# Patient Record
Sex: MALE | Race: OTHER | URBAN - METROPOLITAN AREA
[De-identification: names, ages, dates, MRNs, and addresses within clinical notes are randomized per-mention and may not be internally consistent; named-entity substitution may affect disease eponyms.]

---

## 2022-01-18 NOTE — PATIENT DISCUSSION
FHX, THICK CCT.  HVF AND ERG REVIEWED W/ PT.  OCT BASELINE TODAY.  CONTINUE TO FOLLOW W/ DR PRICE W/ OCT, HVF AND OPTIC NERVE EVAL.  IF CHANGES SEND BACK TO DR Alexandria Crews.

## 2022-07-12 PROBLEM — Z98.890: Noted: 2022-07-12

## 2023-01-23 ENCOUNTER — NEW PATIENT (OUTPATIENT)
Dept: URBAN - METROPOLITAN AREA CLINIC 1 | Facility: CLINIC | Age: 63
End: 2023-01-23

## 2023-01-23 VITALS — HEIGHT: 72 IN | WEIGHT: 200 LBS | BODY MASS INDEX: 27.09 KG/M2

## 2023-01-23 DIAGNOSIS — H25.813: ICD-10-CM

## 2023-01-23 DIAGNOSIS — E11.3491: ICD-10-CM

## 2023-01-23 DIAGNOSIS — E11.3592: ICD-10-CM

## 2023-01-23 DIAGNOSIS — Z79.4: ICD-10-CM

## 2023-01-23 PROCEDURE — 92004 COMPRE OPH EXAM NEW PT 1/>: CPT

## 2023-01-23 PROCEDURE — 92015 DETERMINE REFRACTIVE STATE: CPT

## 2023-01-23 ASSESSMENT — KERATOMETRY
OD_AXISANGLE_DEGREES: 126
OD_AXISANGLE2_DEGREES: 36
OS_K2POWER_DIOPTERS: 44.50
OD_K2POWER_DIOPTERS: 44.00
OS_AXISANGLE_DEGREES: 020
OD_K1POWER_DIOPTERS: 43.50
OS_AXISANGLE2_DEGREES: 110
OS_K1POWER_DIOPTERS: 44.00

## 2023-01-23 ASSESSMENT — VISUAL ACUITY
OS_CC: 20/150
OS_SC: 20/150
OD_CC: J7
OD_CC: 20/60
OS_CC: J7
OS_BAT: 20/400
OD_SC: 20/80-2
OD_BAT: 20/200

## 2023-01-23 ASSESSMENT — TONOMETRY
OS_IOP_MMHG: 23
OD_IOP_MMHG: 16

## 2023-05-23 ENCOUNTER — PRE-OP/H&P (OUTPATIENT)
Dept: URBAN - METROPOLITAN AREA CLINIC 1 | Facility: CLINIC | Age: 63
End: 2023-05-23

## 2023-05-23 VITALS
WEIGHT: 110 LBS | HEIGHT: 72 IN | HEART RATE: 82 BPM | SYSTOLIC BLOOD PRESSURE: 110 MMHG | DIASTOLIC BLOOD PRESSURE: 56 MMHG | BODY MASS INDEX: 14.9 KG/M2

## 2023-05-23 DIAGNOSIS — H25.813: ICD-10-CM

## 2023-05-23 PROCEDURE — 99499 UNLISTED E&M SERVICE: CPT

## 2023-05-23 ASSESSMENT — KERATOMETRY
OS_AXISANGLE2_DEGREES: 110
OS_K2POWER_DIOPTERS: 44.50
OD_AXISANGLE_DEGREES: 126
OD_AXISANGLE2_DEGREES: 36
OS_K1POWER_DIOPTERS: 44.00
OD_K2POWER_DIOPTERS: 44.00
OS_AXISANGLE_DEGREES: 020
OD_K1POWER_DIOPTERS: 43.50

## 2023-05-31 ENCOUNTER — SURGERY/PROCEDURE (OUTPATIENT)
Dept: URBAN - METROPOLITAN AREA SURGERY 1 | Facility: SURGERY | Age: 63
End: 2023-05-31

## 2023-05-31 DIAGNOSIS — H25.812: ICD-10-CM

## 2023-05-31 PROCEDURE — 66984 XCAPSL CTRC RMVL W/O ECP: CPT

## 2023-05-31 ASSESSMENT — KERATOMETRY
OS_K2POWER_DIOPTERS: 44.50
OD_K1POWER_DIOPTERS: 43.50
OD_AXISANGLE_DEGREES: 126
OS_K1POWER_DIOPTERS: 44.00
OS_AXISANGLE_DEGREES: 020
OS_AXISANGLE2_DEGREES: 110
OD_AXISANGLE2_DEGREES: 36
OD_K2POWER_DIOPTERS: 44.00

## 2023-06-01 ENCOUNTER — POST-OP (OUTPATIENT)
Dept: URBAN - METROPOLITAN AREA CLINIC 1 | Facility: CLINIC | Age: 63
End: 2023-06-01

## 2023-06-01 DIAGNOSIS — Z96.1: ICD-10-CM

## 2023-06-01 PROCEDURE — 99024 POSTOP FOLLOW-UP VISIT: CPT

## 2023-06-01 ASSESSMENT — KERATOMETRY
OS_AXISANGLE2_DEGREES: 110
OD_K2POWER_DIOPTERS: 44.00
OS_K2POWER_DIOPTERS: 44.50
OD_AXISANGLE2_DEGREES: 36
OD_AXISANGLE_DEGREES: 126
OD_K1POWER_DIOPTERS: 43.50
OS_K1POWER_DIOPTERS: 44.00
OS_AXISANGLE_DEGREES: 020

## 2023-06-01 ASSESSMENT — VISUAL ACUITY: OS_SC: 20/200

## 2023-06-01 ASSESSMENT — TONOMETRY: OS_IOP_MMHG: 20

## 2023-06-27 ENCOUNTER — POST-OP (OUTPATIENT)
Dept: URBAN - METROPOLITAN AREA CLINIC 1 | Facility: CLINIC | Age: 63
End: 2023-06-27

## 2023-06-27 DIAGNOSIS — H43.12: ICD-10-CM

## 2023-06-27 PROCEDURE — 99024 POSTOP FOLLOW-UP VISIT: CPT

## 2023-06-27 ASSESSMENT — VISUAL ACUITY: OS_SC: 20/400

## 2023-06-27 ASSESSMENT — KERATOMETRY
OS_K2POWER_DIOPTERS: 44.50
OD_K1POWER_DIOPTERS: 43.50
OD_AXISANGLE_DEGREES: 126
OS_AXISANGLE_DEGREES: 020
OS_AXISANGLE2_DEGREES: 110
OS_K1POWER_DIOPTERS: 44.00
OD_K2POWER_DIOPTERS: 44.00
OD_AXISANGLE2_DEGREES: 36

## 2023-06-27 ASSESSMENT — TONOMETRY: OS_IOP_MMHG: 20

## 2023-07-24 ENCOUNTER — POST-OP (OUTPATIENT)
Dept: URBAN - METROPOLITAN AREA CLINIC 1 | Facility: CLINIC | Age: 63
End: 2023-07-24

## 2023-07-24 DIAGNOSIS — H25.811: ICD-10-CM

## 2023-07-24 DIAGNOSIS — Z96.1: ICD-10-CM

## 2023-07-24 PROCEDURE — 99024 POSTOP FOLLOW-UP VISIT: CPT

## 2023-07-24 ASSESSMENT — TONOMETRY
OS_IOP_MMHG: 19
OD_IOP_MMHG: 16

## 2023-07-24 ASSESSMENT — VISUAL ACUITY
OS_SC: 20/30-1
OD_SC: 20/70-2

## 2023-08-15 ENCOUNTER — POST OP/EVAL OF SECOND EYE (OUTPATIENT)
Dept: URBAN - METROPOLITAN AREA CLINIC 1 | Facility: CLINIC | Age: 63
End: 2023-08-15

## 2023-08-15 VITALS
DIASTOLIC BLOOD PRESSURE: 56 MMHG | BODY MASS INDEX: 14.9 KG/M2 | HEIGHT: 72 IN | HEART RATE: 82 BPM | SYSTOLIC BLOOD PRESSURE: 110 MMHG | WEIGHT: 110 LBS

## 2023-08-15 DIAGNOSIS — H25.811: ICD-10-CM

## 2023-08-15 PROCEDURE — 99024 POSTOP FOLLOW-UP VISIT: CPT

## 2023-08-15 PROCEDURE — 92136 OPHTHALMIC BIOMETRY: CPT

## 2023-08-15 PROCEDURE — 92025 CPTRIZED CORNEAL TOPOGRAPHY: CPT

## 2023-08-15 ASSESSMENT — VISUAL ACUITY
OD_PH: 20/50
OD_CC: J2
OD_CC: 20/60
OS_SC: 20/20
OS_SC: J1+

## 2023-08-15 ASSESSMENT — TONOMETRY
OD_IOP_MMHG: 17
OS_IOP_MMHG: 17

## 2023-08-23 ENCOUNTER — SURGERY/PROCEDURE (OUTPATIENT)
Dept: URBAN - METROPOLITAN AREA SURGERY 1 | Facility: SURGERY | Age: 63
End: 2023-08-23

## 2023-08-23 DIAGNOSIS — H25.811: ICD-10-CM

## 2023-08-23 PROCEDURE — 66984 XCAPSL CTRC RMVL W/O ECP: CPT

## 2023-08-24 ENCOUNTER — POST-OP (OUTPATIENT)
Dept: URBAN - METROPOLITAN AREA CLINIC 1 | Facility: CLINIC | Age: 63
End: 2023-08-24

## 2023-08-24 DIAGNOSIS — Z96.1: ICD-10-CM

## 2023-08-24 PROCEDURE — 99024 POSTOP FOLLOW-UP VISIT: CPT

## 2023-08-24 ASSESSMENT — VISUAL ACUITY
OS_SC: J1
OD_SC: J3
OD_SC: 20/80
OS_SC: 20/20-1

## 2023-08-24 ASSESSMENT — TONOMETRY
OS_IOP_MMHG: 17
OD_IOP_MMHG: 20

## 2023-09-15 ENCOUNTER — POST-OP (OUTPATIENT)
Dept: URBAN - METROPOLITAN AREA CLINIC 1 | Facility: CLINIC | Age: 63
End: 2023-09-15

## 2023-09-15 DIAGNOSIS — Z96.1: ICD-10-CM

## 2023-09-15 PROCEDURE — 99024 POSTOP FOLLOW-UP VISIT: CPT

## 2023-09-15 ASSESSMENT — VISUAL ACUITY
OD_SC: 20/30-1
OS_SC: 20/25

## 2023-09-15 ASSESSMENT — TONOMETRY
OD_IOP_MMHG: 15
OS_IOP_MMHG: 15

## 2024-02-01 ENCOUNTER — COMPREHENSIVE EXAM (OUTPATIENT)
Dept: URBAN - METROPOLITAN AREA CLINIC 1 | Facility: CLINIC | Age: 64
End: 2024-02-01

## 2024-02-01 DIAGNOSIS — Z79.4: ICD-10-CM

## 2024-02-01 DIAGNOSIS — H26.493: ICD-10-CM

## 2024-02-01 DIAGNOSIS — E11.3592: ICD-10-CM

## 2024-02-01 DIAGNOSIS — E11.3491: ICD-10-CM

## 2024-02-01 PROCEDURE — 92015 DETERMINE REFRACTIVE STATE: CPT

## 2024-02-01 PROCEDURE — 99214 OFFICE O/P EST MOD 30 MIN: CPT

## 2024-02-01 ASSESSMENT — VISUAL ACUITY
OS_BAT: 20/40
OS_CC: J2
OD_CC: J2
OD_CC: 20/20
OS_CC: 20/20
OD_BAT: 20/60

## 2024-02-01 ASSESSMENT — TONOMETRY
OD_IOP_MMHG: 13
OS_IOP_MMHG: 13

## 2024-03-22 ENCOUNTER — CLINIC PROCEDURE ONLY (OUTPATIENT)
Dept: URBAN - METROPOLITAN AREA CLINIC 1 | Facility: CLINIC | Age: 64
End: 2024-03-22

## 2024-03-22 DIAGNOSIS — Z96.1: ICD-10-CM

## 2024-03-22 DIAGNOSIS — H26.493: ICD-10-CM

## 2024-03-22 PROCEDURE — 66821 AFTER CATARACT LASER SURGERY: CPT

## 2024-04-05 ENCOUNTER — CLINIC PROCEDURE ONLY (OUTPATIENT)
Dept: URBAN - METROPOLITAN AREA CLINIC 1 | Facility: CLINIC | Age: 64
End: 2024-04-05

## 2024-04-05 DIAGNOSIS — H26.492: ICD-10-CM

## 2024-04-05 DIAGNOSIS — Z96.1: ICD-10-CM

## 2024-04-05 PROCEDURE — 66821 AFTER CATARACT LASER SURGERY: CPT | Mod: 79,LT

## 2024-04-26 ENCOUNTER — POST-OP (OUTPATIENT)
Dept: URBAN - METROPOLITAN AREA CLINIC 1 | Facility: CLINIC | Age: 64
End: 2024-04-26

## 2024-04-26 DIAGNOSIS — Z96.1: ICD-10-CM

## 2024-04-26 DIAGNOSIS — Z98.890: ICD-10-CM

## 2024-04-26 ASSESSMENT — VISUAL ACUITY
OD_SC: J10
OS_SC: J8
OD_SC: 20/20
OS_SC: 20/20

## 2025-02-27 ENCOUNTER — COMPREHENSIVE EXAM (OUTPATIENT)
Age: 65
End: 2025-02-27

## 2025-02-27 DIAGNOSIS — Z79.4: ICD-10-CM

## 2025-02-27 DIAGNOSIS — E11.3592: ICD-10-CM

## 2025-02-27 DIAGNOSIS — E11.3491: ICD-10-CM

## 2025-02-27 PROCEDURE — 99214 OFFICE O/P EST MOD 30 MIN: CPT
